# Patient Record
Sex: FEMALE | Race: WHITE | NOT HISPANIC OR LATINO | URBAN - METROPOLITAN AREA
[De-identification: names, ages, dates, MRNs, and addresses within clinical notes are randomized per-mention and may not be internally consistent; named-entity substitution may affect disease eponyms.]

---

## 2017-03-27 ENCOUNTER — OFFICE VISIT - RIVER FALLS (OUTPATIENT)
Dept: FAMILY MEDICINE | Facility: CLINIC | Age: 67
End: 2017-03-27

## 2017-03-27 ASSESSMENT — MIFFLIN-ST. JEOR: SCORE: 1331.01

## 2017-08-27 ENCOUNTER — OFFICE VISIT - RIVER FALLS (OUTPATIENT)
Dept: FAMILY MEDICINE | Facility: CLINIC | Age: 67
End: 2017-08-27

## 2018-01-10 ENCOUNTER — OFFICE VISIT - RIVER FALLS (OUTPATIENT)
Dept: FAMILY MEDICINE | Facility: CLINIC | Age: 68
End: 2018-01-10

## 2018-01-10 ASSESSMENT — MIFFLIN-ST. JEOR: SCORE: 1322.84

## 2019-08-31 ENCOUNTER — OFFICE VISIT - RIVER FALLS (OUTPATIENT)
Dept: FAMILY MEDICINE | Facility: CLINIC | Age: 69
End: 2019-08-31

## 2022-02-12 VITALS
DIASTOLIC BLOOD PRESSURE: 62 MMHG | HEIGHT: 68 IN | TEMPERATURE: 97.3 F | WEIGHT: 165.2 LBS | SYSTOLIC BLOOD PRESSURE: 96 MMHG | BODY MASS INDEX: 25.04 KG/M2

## 2022-02-12 VITALS
SYSTOLIC BLOOD PRESSURE: 114 MMHG | HEART RATE: 74 BPM | WEIGHT: 167 LBS | BODY MASS INDEX: 25.31 KG/M2 | DIASTOLIC BLOOD PRESSURE: 62 MMHG | HEIGHT: 68 IN

## 2022-02-12 VITALS
WEIGHT: 165.2 LBS | SYSTOLIC BLOOD PRESSURE: 110 MMHG | TEMPERATURE: 98.2 F | DIASTOLIC BLOOD PRESSURE: 66 MMHG | BODY MASS INDEX: 25.12 KG/M2 | HEART RATE: 68 BPM

## 2022-02-12 VITALS
BODY MASS INDEX: 24.97 KG/M2 | HEART RATE: 59 BPM | SYSTOLIC BLOOD PRESSURE: 108 MMHG | DIASTOLIC BLOOD PRESSURE: 80 MMHG | WEIGHT: 164.2 LBS | TEMPERATURE: 97.6 F

## 2022-02-15 NOTE — PROGRESS NOTES
Chief Complaint    Patient is here for earwax blockage in right ear. No pain.  History of Present Illness      Chief complaint as above reviewed and confirmed with patient.  Pt presents to the clinic with concerns re: R ear plugged sensation.  She has a hx of cerumen impaction and has very narrow ear canals.  Had started noting sx about 1 week ago and started doing mineral oil, washing out with water but not making it better.  No fevers. no otalgia.  Physical Exam   Vitals & Measurements    T: 97.6   F (Tympanic)  HR: 59(Peripheral)  BP: 108/80     WT: 164.2 lb       exam of the R ear reveals cerumen impaction.  It was easily removed with currette by myself.  Pt tolerated well.       Sx resolved.  TM intact, canal is nonerythematous non edematous.          Assessment/Plan       1. Cerumen impaction (H61.23)         removal by currattage, resolved.  FU prn.  Patient Information     Name:SISSY NEW      Address:      [address] [address]     Sex:Female     YOB: 1950     [phone]     Emergency Contact:WESLY NEW     MRN:[id number]     FIN:[id number]     Location:[facility]     Date of Service:08/31/2019      Primary Care Physician:       NONE ,       Attending Physician:       Kailee Santoyo PA-C, [phone]  Problem List/Past Medical History    Ongoing     Macular degeneration    Historical     No qualifying data  Procedure/Surgical History     Colonoscopy (02/12/2009)           Flexible sigmoidoscopy (08/19/2004)           Childbirth           Childbirth        Medications   No active medications  Allergies    No Known Medication Allergies  Social History    Smoking Status - 01/10/2018     Never smoker     Alcohol      Current, 04/24/2012     Employment/School      Retired, 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 Exercise type:  Walking., 04/24/2012     Home/Environment      Marital status: . Spouse/Partner name: Nir Castellanos. 2 children., 04/24/2012     Substance Abuse      Never, 04/24/2012     Tobacco      Never, 04/24/2012  Family History    Diabetes mellitus: Brother.    Dyspnea: Brother.    Heart disease: Mother and Father.    Hypertension: Brother.    Stroke: Mother and Father.  Immunizations      Vaccine Date Status      Td 07/03/2003 Recorded      Td 12/21/1993 Recorded

## 2022-02-15 NOTE — PROGRESS NOTES
Patient:   SISSY NEW            MRN: [id number]            FIN: [id number]               Age:   67 years     Sex:  Female     :  1950   Associated Diagnoses:   Abrasion of right ear; Cerumen impaction   Author:   Roxy Randall MD      Visit Information      Date of Service: 01/10/2018 10:20 am  Performing Location: [facility]  Encounter#: [id number]      Primary Care Provider (PCP):  [id number] -UNKNOWN,      Referring Provider:  Roxy Randall MD    NPI# [id number]   Visit type:  New symptom.    Accompanied by:  Mother.    Source of history:  Mother, Medical record.    History limitation:  Patient's age.       Chief Complaint   1/10/2018 10:28 AM CST   Patient presents for bilateral ear lavage.      History of Present Illness             The patient presents with earache.  The location of the earache is both ears.  The earache is characterized by hearing loss.  The severity of the earache is moderate.  The earache is episodic.  The earache has lasted for 2 day(s).  The context of the earache: occurred not after camping, not after swimming, not after trauma and not in association with illness.  Exacerbating factors consist of none.  Relieving factors consist of none.  Associated symptoms consist of pt has hx of previous cerumen impaction.     This is his second ear infection.      Review of Systems   Constitutional:  Negative except as documented in history of present illness.    Eye:  Negative except as documented in history of present illness.    Ear/Nose/Mouth/Throat:  Negative except as documented in history of present illness.    Respiratory:  Negative except as documented in history of present illness.    Cardiovascular:  Negative except as documented in history of present illness.    Gastrointestinal:  Negative except as documented in history of present illness.    Immunologic:  Negative except as documented in history of present illness.    Integumentary:  Negative except as  documented in history of present illness.              Health Status   Allergies:    Allergic Reactions (Selected)  No Known Medication Allergies   Medications:  (Selected)   Prescriptions  Prescribed  hydrocortisone/neomycin/polymyxin B 1%-0.35%-10,000 units/mL otic suspension: 2 drop(s), Both ears, QID, # 10 mL, 0 Refill(s), Type: Maintenance, Pharmacy: [facility], 2 drop(s) both ears qid,x7 day(s)   Problem list:    All Problems  Macular degeneration / ICD-9-.50 / Confirmed      Histories   Past Medical History:    Active  Macular degeneration (362.50): Onset in  at 53 years.   Family History:    Diabetes mellitus  Brother (Andreas)  Hypertension  Brother (Andreas)  Heart disease  Mother ()  Father ()  Stroke  Mother ()  Father ()  Dyspnea  Brother (Andreas)     Procedure history:    Colonoscopy (804284648) on 2009 at 58 Years.  Flexible sigmoidoscopy (50853875) on 2004 at 54 Years.  Childbirth ([id number]).  Childbirth ([id number]).   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      Home and Environment Assessment            Marital status: .  Spouse/Partner name: Nir Castellanos.  2 children.      Exercise and Physical Activity Assessment            Exercise frequency: 6-7 times/week.  Exercise type: Walking.        Physical Examination   Vital Signs   1/10/2018 10:28 AM CST Temperature Tympanic 97.3 DegF  LOW    Systolic Blood Pressure 96 mmHg    Diastolic Blood Pressure 62 mmHg    Mean Arterial Pressure 73 mmHg    BP Site Right arm    BP Method Manual      Measurements from flowsheet : Measurements   1/10/2018 10:28 AM CST Height Measured - Standard 68 in    Weight Measured - Standard 165.2 lb    BSA 1.89  m2    Body Mass Index 25.12 kg/m2  HI      General:  No acute distress.    Eye:  Pupils are equal, round and reactive to light, Extraocular movements are intact, Normal conjunctiva.    HENT:  Normocephalic, Tympanic membranes are clear, Oral mucosa is moist, No pharyngeal erythema, tm obstructed by cerumen but improved with nurse ear lavage.  right ear canal has small abrasion present.    Neck:  Supple.    Respiratory:  Lungs are clear to auscultation, Respirations are non-labored, Breath sounds are equal, Symmetrical chest wall expansion, No chest wall tenderness.    Cardiovascular:  Normal rate, Regular rhythm, No murmur.    Gastrointestinal:  Soft, Normal bowel sounds.    Musculoskeletal:  moving all 4 extremities.    Integumentary:  Warm, Dry, No rash.    Neurologic:  Alert, happy and interactive.    Psychiatric:  Cooperative, Appropriate mood & affect.       Health Maintenance      Recommendations     Pending (in the next year)        OverDue           Tetanus Vaccine due  07/03/13  and every 10  year(s)        Due            Alcohol Misuse Screen (Female) due  01/10/18  and every 1  year(s)           Aspirin Therapy for Prevention of CVD (Female) due  01/10/18  and every 5  year(s)           Breast Cancer Screen due  01/10/18  and every 2  year(s)           Cervical Cancer Screen (if sexually active) due  01/10/18  and every 3  year(s)           Depression Screen (Female) due  01/10/18  and every 1  year(s)           Fall Risk Screen (Female) due  01/10/18  and every 1  year(s)           HIV Screen (if sexually active) (Female) due  01/10/18  and every 1  year(s)           Influenza Vaccine due  01/10/18  and every 1  year(s)           Lipid Disorders Screen (Female) due  01/10/18  and every 1  year(s)           Lung Cancer Screen (Female) due  01/10/18  and every 1  year(s)           Osteoporosis Screen due  01/10/18  and every 2  year(s)           Pneumococcal Vaccine due  01/10/18  One-time only            STD Counseling (if sexually active) (Female) due  01/10/18  and every 1  year(s)           Syphilis Screen (if sexually active) (Female) due  01/10/18  and every 1  year(s)           Zoster/Shingles Vaccine due  01/10/18  One-time only     Satisfied (in the past 1 year)        Satisfied            Body Mass Index Check (Female) on  01/10/18.           Body Mass Index Check (Female) on  03/27/17.           High Blood Pressure Screen (Female) on  01/10/18.           High Blood Pressure Screen (Female) on  08/27/17.           High Blood Pressure Screen (Female) on  03/27/17.           Tobacco Use Screen (Female) on  01/10/18.           Tobacco Use Screen (Female) on  08/27/17.           Tobacco Use Screen (Female) on  03/27/17.          Impression and Plan   Diagnosis     Abrasion of right ear (KLV64-[id number] S00.411A).     Cerumen impaction (EUS95-UX H61.23).     Course:  Progressing as expected.    Patient Instructions:       Counseled: Family, Regarding diagnosis, Regarding treatment, Regarding medications.    Summary:  cerumen removal bilateral by CSS with flushing tolerated well with good results .    Orders     Orders (Selected)   Prescriptions  Prescribed  hydrocortisone/neomycin/polymyxin B 1%-0.35%-10,000 units/mL otic suspension: 2 drop(s), Both ears, QID, # 10 mL, 0 Refill(s), Type: Maintenance, Pharmacy: [facility], 2 drop(s) both ears qid,x7 day(s).     Diagnosis     return to clinic if symptoms worsen or do not improve.     Plan

## 2022-02-15 NOTE — PROGRESS NOTES
Patient:   SISSY NEW            MRN: [id number]            FIN: [id number]               Age:   67 years     Sex:  Female     :  1950   Associated Diagnoses:   Wax in ear   Author:   Vinay Markham MD      Chief Complaint   3/27/2017 2:08 PM CDT    c/o right ear feels plugged      History of Present Illness   see chief complaint as noted above and confirmed with the patient  67 year old female presenting with a plugged right ear. She states that she noticed it about a week ago and has been using debrox for the last few  days without being able to remove the wax on her own. She does water aerobics three times a week and feels this made it a little worse. Denies any pain.       Review of Systems   Constitutional:  No fever, No chills.    Ear/Nose/Mouth/Throat:  No ear pain, No ear discharge, No sinus pain, No sore throat        Decreased hearing: On the right.         Ear wax: Right.    Respiratory:  No cough.    Cardiovascular:  No chest pain.    Gastrointestinal:  No nausea, No vomiting.    Integumentary:  No rash.    Neurologic:  No headache.              Health Status   Allergies:    Allergic Reactions (Selected)  No known allergies   Medications:  (Selected)      Problem list:    All Problems  Macular degeneration / ICD-9-.50 / Confirmed      Histories   Past Medical History:    Active  Macular degeneration (362.50): Onset in  at 53 years.   Family History:    Diabetes mellitus  Brother (Andreas)  Hypertension  Brother ([facility])  Heart disease  Mother ()  Father ()  Stroke  Mother ()  Father ()  Dyspnea  Brother ([facility])     Procedure history:    Colonoscopy (068070994) on 2009 at 58 Years.  Flexible sigmoidoscopy (95469519) on 2004 at 54 Years.  Childbirth ([id number]).  Childbirth ([id number]).   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      Home and Environment Assessment            Marital status: .  Spouse/Partner name: Nir Castellanos.  2 children.      Exercise and Physical Activity Assessment            Exercise frequency: 6-7 times/week.  Exercise type: Walking.        Physical Examination   Vital Signs   3/27/2017 2:08 PM CDT Peripheral Pulse Rate 74 bpm    Systolic Blood Pressure 114 mmHg    Diastolic Blood Pressure 62 mmHg    Mean Arterial Pressure 79 mmHg      Measurements from flowsheet : Measurements   3/27/2017 2:08 PM CDT Height Measured - Standard 68 in    Weight Measured - Standard 167.0 lb    BSA 1.9 m2    Body Mass Index 25.39 kg/m2      General:  Alert and oriented, No acute distress.    Eye:  Pupils are equal, round and reactive to light, Normal conjunctiva.    HENT:  Oral mucosa is moist, Rate ear canal occluded with cerumen.    Neck:  Supple, No lymphadenopathy.    Psychiatric:  Cooperative, Appropriate mood & affect, Normal judgment.       Review / Management   Results review      Impression and Plan   Diagnosis     Wax in ear ([id number] H61.21).     Plan:  Right ear was cleaned of wax. wax was irrigated and curreted with complete removal and no complications  Reviewed expected course, what to watch for, and when to return.   [facility], acted solely as a scribe for, and in the presence of Dr. Vinay Markham who performed the service..

## 2022-02-15 NOTE — PROGRESS NOTES
Patient:   SISSY NEW            MRN: [id number]            FIN: [id number]               Age:   67 years     Sex:  Female     :  1950   Associated Diagnoses:   Impacted cerumen   Author:   Nataliya STACK, Angel      Procedure   Ear foreign body removal procedure   Date/ Time:  2017 12:11:00 PM.     Confirmed: patient.     Performed by: self.     Informed consent: Verbally obtained.     Indication: ear fullness, hearing disturbance.     Location: left ear, right ear.     Preparation and technique: positioned sitting upright, method including (cerumen loop, irrigation with warm tap water).     Procedure tolerated: well.     Impacted Cerumen Removed.     No Complications.        Impression and Plan   Diagnosis     Impacted cerumen ([id number]-GZ H61.23).     Orders     F/U  if not improving, sooner if getting worse.

## 2022-02-15 NOTE — NURSING NOTE
Comprehensive Intake Entered On:  8/31/2019 10:22 AM CDT    Performed On:  8/31/2019 10:18 AM CDT by Calli Henriquez RN               Summary   Chief Complaint :   Patient is here for earwax blockage in right ear. No pain.    Weight Measured :   164.2 lb(Converted to: 164 lb 3 oz, 74.48 kg)    Systolic Blood Pressure :   108 mmHg   Diastolic Blood Pressure :   80 mmHg   Mean Arterial Pressure :   89 mmHg   Peripheral Pulse Rate :   59 bpm (LOW)    BP Site :   Right arm   BP Method :   Manual   HR Method :   Electronic   Temperature Tympanic :   97.6 DegF(Converted to: 36.4 DegC)  (LOW)    Calli Henriquez RN - 8/31/2019 10:18 AM CDT   Health Status   Allergies Verified? :   Yes   Medication History Verified? :   Yes   Pre-Visit Planning Status :   N/A   Calli Henriquez RN - 8/31/2019 10:18 AM CDT   Meds / Allergies   (As Of: 8/31/2019 10:22:35 AM CDT)   Allergies (Active)   No Known Medication Allergies  Estimated Onset Date:   Unspecified ; Created By:   Bee Linder CMA; Reaction Status:   Active ; Category:   Drug ; Substance:   No Known Medication Allergies ; Type:   Allergy ; Updated By:   Bee Linder CMA; Reviewed Date:   8/31/2019 10:21 AM CDT        Medication List   (As Of: 8/31/2019 10:22:35 AM CDT)   Prescription/Discharge Order    hydrocortisone/neomycin/polymyxin B otic  :   hydrocortisone/neomycin/polymyxin B otic ; Status:   Processing ; Ordered As Mnemonic:   hydrocortisone/neomycin/polymyxin B 1%-0.35%-10,000 units/mL otic suspension ; Ordering Provider:   Roxy Randall MD; Action Display:   Complete ; Catalog Code:   hydrocortisone/neomycin/polymyxin B otic ; Order Dt/Tm:   8/31/2019 10:18:26 AM